# Patient Record
Sex: FEMALE | Race: WHITE
[De-identification: names, ages, dates, MRNs, and addresses within clinical notes are randomized per-mention and may not be internally consistent; named-entity substitution may affect disease eponyms.]

---

## 2019-07-30 ENCOUNTER — HOSPITAL ENCOUNTER (INPATIENT)
Dept: HOSPITAL 95 - ER | Age: 59
LOS: 2 days | Discharge: HOME | DRG: 897 | End: 2019-08-01
Attending: FAMILY MEDICINE | Admitting: FAMILY MEDICINE
Payer: COMMERCIAL

## 2019-07-30 VITALS — WEIGHT: 110.01 LBS | HEIGHT: 62 IN | BODY MASS INDEX: 20.24 KG/M2

## 2019-07-30 DIAGNOSIS — R74.0: ICD-10-CM

## 2019-07-30 DIAGNOSIS — F17.200: ICD-10-CM

## 2019-07-30 DIAGNOSIS — E44.0: ICD-10-CM

## 2019-07-30 DIAGNOSIS — E86.9: ICD-10-CM

## 2019-07-30 DIAGNOSIS — I10: ICD-10-CM

## 2019-07-30 DIAGNOSIS — E83.42: ICD-10-CM

## 2019-07-30 DIAGNOSIS — M32.9: ICD-10-CM

## 2019-07-30 DIAGNOSIS — J44.9: ICD-10-CM

## 2019-07-30 DIAGNOSIS — E78.5: ICD-10-CM

## 2019-07-30 DIAGNOSIS — F10.239: Primary | ICD-10-CM

## 2019-07-30 DIAGNOSIS — E87.6: ICD-10-CM

## 2019-07-30 LAB
ALBUMIN SERPL BCP-MCNC: 3.9 G/DL (ref 3.4–5)
ALBUMIN/GLOB SERPL: 1 {RATIO} (ref 0.8–1.8)
ALT SERPL W P-5'-P-CCNC: 57 U/L (ref 12–78)
AMYLASE SERPL-CCNC: 56 U/L (ref 25–115)
ANION GAP SERPL CALCULATED.4IONS-SCNC: 18 MMOL/L (ref 6–16)
AST SERPL W P-5'-P-CCNC: 116 U/L (ref 12–37)
BASOPHILS # BLD AUTO: 0.05 K/MM3 (ref 0–0.23)
BASOPHILS NFR BLD AUTO: 1 % (ref 0–2)
BENZODIAZ UR-MCNC: DETECTED UG/L
BILIRUB SERPL-MCNC: 0.7 MG/DL (ref 0.1–1)
BUN SERPL-MCNC: 8 MG/DL (ref 8–24)
CALCIUM SERPL-MCNC: 8.6 MG/DL (ref 8.5–10.1)
CHLORIDE SERPL-SCNC: 99 MMOL/L (ref 98–108)
CO2 SERPL-SCNC: 24 MMOL/L (ref 21–32)
CREAT SERPL-MCNC: 0.73 MG/DL (ref 0.4–1)
DEPRECATED RDW RBC AUTO: 59.6 FL (ref 35.1–46.3)
EOSINOPHIL # BLD AUTO: 0.03 K/MM3 (ref 0–0.68)
EOSINOPHIL NFR BLD AUTO: 0 % (ref 0–6)
ERYTHROCYTE [DISTWIDTH] IN BLOOD BY AUTOMATED COUNT: 14.2 % (ref 11.7–14.2)
ETHANOL SERPL-MCNC: <3 MG/DL
GLOBULIN SER CALC-MCNC: 4 G/DL (ref 2.2–4)
GLUCOSE SERPL-MCNC: 140 MG/DL (ref 70–99)
HCT VFR BLD AUTO: 40.4 % (ref 33–51)
HGB BLD-MCNC: 13.6 G/DL (ref 11.5–16)
IMM GRANULOCYTES # BLD AUTO: 0.04 K/MM3 (ref 0–0.1)
IMM GRANULOCYTES NFR BLD AUTO: 0 % (ref 0–1)
LEUKOCYTE ESTERASE UR QL STRIP: (no result)
LYMPHOCYTES # BLD AUTO: 1.42 K/MM3 (ref 0.84–5.2)
LYMPHOCYTES NFR BLD AUTO: 15 % (ref 21–46)
MAGNESIUM SERPL-MCNC: 0.7 MG/DL (ref 1.6–2.4)
MCHC RBC AUTO-ENTMCNC: 33.7 G/DL (ref 31.5–36.5)
MCV RBC AUTO: 115 FL (ref 80–100)
MONOCYTES # BLD AUTO: 1.11 K/MM3 (ref 0.16–1.47)
MONOCYTES NFR BLD AUTO: 12 % (ref 4–13)
NEUTROPHILS # BLD AUTO: 6.94 K/MM3 (ref 1.96–9.15)
NEUTROPHILS NFR BLD AUTO: 72 % (ref 41–73)
NRBC # BLD AUTO: 0.13 K/MM3 (ref 0–0.02)
NRBC BLD AUTO-RTO: 1.4 /100 WBC (ref 0–0.2)
PHOSPHATE SERPL-MCNC: 2.5 MG/DL (ref 2.5–4.9)
PLATELET # BLD AUTO: 192 K/MM3 (ref 150–400)
POTASSIUM SERPL-SCNC: 3 MMOL/L (ref 3.5–5.5)
PROT SERPL-MCNC: 7.9 G/DL (ref 6.4–8.2)
PROTHROMBIN TIME: 11.1 SEC (ref 9.7–11.5)
RBC #/AREA URNS HPF: (no result) /HPF (ref 0–2)
SODIUM SERPL-SCNC: 141 MMOL/L (ref 136–145)
SP GR SPEC: 1 (ref 1–1.02)
TSH SERPL DL<=0.005 MIU/L-ACNC: 1.1 UIU/ML (ref 0.36–4.8)
UROBILINOGEN UR STRIP-MCNC: (no result) MG/DL
WBC #/AREA URNS HPF: (no result) /HPF (ref 0–5)

## 2019-07-30 PROCEDURE — G0480 DRUG TEST DEF 1-7 CLASSES: HCPCS

## 2019-07-30 PROCEDURE — C9113 INJ PANTOPRAZOLE SODIUM, VIA: HCPCS

## 2019-07-30 NOTE — NUR
SHIFT SUMMARY.
 
NO ACUTE CHANGES NOTED SINCE ADMIT. PT HAS BEEN MEDICATED PER EMAR FOR CWIA OF
9. PT STATES SHE IS VERY ANXIOUS ABOUT BEING ABLE TO SLEEP TONIGHT. PT'S VS
STABLE, PT IS A&Ox4 AND HAS BEEN USING THE CALL LIGHT APPROPRIATELY.
 
CALL LIGHT IN REACH, BED IS LOCKED AND LOW WILL CONTINUE TO MONITOR UNTIL
REPORT IS GIVEN TO ONCOMING RN.

## 2019-07-30 NOTE — NUR
PT ARRIVAL...
 
PT ARRIVED ON UNIT AT 1315, PT WAS ADMITTED FOR ETOH W/D. PT IS A&Ox4 AND SBA.
PT WAS ABLE TO SELF TRANSFER FROM THE [place] TO THE BED.  PT HAS SEVERE
TREMORS D/T ETOH W/D. PT'S LAST DRINK WAS "LAST NIGHT." PT STATED SHE DRINKS
"AT LEAST 20 SHOTS OF HARD ALCOHOL ALL DAY." PT'S VS STABLE AT THIS TIME. PT
ORIENTED TO ROOM.
 
WILL CONTINUE TO MONITOR.

## 2019-07-31 LAB
ALBUMIN SERPL BCP-MCNC: 2.9 G/DL (ref 3.4–5)
ALBUMIN/GLOB SERPL: 1 {RATIO} (ref 0.8–1.8)
ALT SERPL W P-5'-P-CCNC: 37 U/L (ref 12–78)
ANION GAP SERPL CALCULATED.4IONS-SCNC: 6 MMOL/L (ref 6–16)
AST SERPL W P-5'-P-CCNC: 64 U/L (ref 12–37)
BASOPHILS # BLD AUTO: 0.02 K/MM3 (ref 0–0.23)
BASOPHILS NFR BLD AUTO: 1 % (ref 0–2)
BILIRUB DIRECT SERPL-MCNC: 0.3 MG/DL (ref 0–0.3)
BILIRUB INDIRECT SERPL-MCNC: 0.5 MG/DL (ref 0.1–0.7)
BILIRUB SERPL-MCNC: 0.8 MG/DL (ref 0.1–1)
BUN SERPL-MCNC: 4 MG/DL (ref 8–24)
CALCIUM SERPL-MCNC: 7.5 MG/DL (ref 8.5–10.1)
CHLORIDE SERPL-SCNC: 104 MMOL/L (ref 98–108)
CO2 SERPL-SCNC: 31 MMOL/L (ref 21–32)
CREAT SERPL-MCNC: 0.74 MG/DL (ref 0.4–1)
DEPRECATED RDW RBC AUTO: 62.4 FL (ref 35.1–46.3)
EOSINOPHIL # BLD AUTO: 0.08 K/MM3 (ref 0–0.68)
EOSINOPHIL NFR BLD AUTO: 2 % (ref 0–6)
ERYTHROCYTE [DISTWIDTH] IN BLOOD BY AUTOMATED COUNT: 14.5 % (ref 11.7–14.2)
GLOBULIN SER CALC-MCNC: 3 G/DL (ref 2.2–4)
GLUCOSE SERPL-MCNC: 96 MG/DL (ref 70–99)
HCT VFR BLD AUTO: 33.8 % (ref 33–51)
HGB BLD-MCNC: 11 G/DL (ref 11.5–16)
IMM GRANULOCYTES # BLD AUTO: 0.02 K/MM3 (ref 0–0.1)
IMM GRANULOCYTES NFR BLD AUTO: 1 % (ref 0–1)
LYMPHOCYTES # BLD AUTO: 1.17 K/MM3 (ref 0.84–5.2)
LYMPHOCYTES NFR BLD AUTO: 27 % (ref 21–46)
MAGNESIUM SERPL-MCNC: 1.6 MG/DL (ref 1.6–2.4)
MCHC RBC AUTO-ENTMCNC: 32.5 G/DL (ref 31.5–36.5)
MCV RBC AUTO: 118 FL (ref 80–100)
MONOCYTES # BLD AUTO: 0.66 K/MM3 (ref 0.16–1.47)
MONOCYTES NFR BLD AUTO: 15 % (ref 4–13)
NEUTROPHILS # BLD AUTO: 2.46 K/MM3 (ref 1.96–9.15)
NEUTROPHILS NFR BLD AUTO: 56 % (ref 41–73)
NRBC # BLD AUTO: 0.07 K/MM3 (ref 0–0.02)
NRBC BLD AUTO-RTO: 1.6 /100 WBC (ref 0–0.2)
PLATELET # BLD AUTO: 147 K/MM3 (ref 150–400)
POTASSIUM SERPL-SCNC: 3.4 MMOL/L (ref 3.5–5.5)
PROT SERPL-MCNC: 5.9 G/DL (ref 6.4–8.2)
SODIUM SERPL-SCNC: 141 MMOL/L (ref 136–145)

## 2019-07-31 NOTE — NUR
ASSUMED CARE
PT CARE ASSUMED AT APPROXIMATELY 0000. PT IS CURRENLTY RESTING IN BED AND
APPEARS TO BE SLEEPING. RESPIRATIONS ARE EVEN AND UNLABORED. MOST RECENT VSS.
BED IS IN LOW POSTION, CALL LIGHT IN REACH AND BED ALARM CURRENTLY SET FOR
SAFETY.

## 2019-07-31 NOTE — NUR
SHIFT SUMMARY.
 
NO ACUTE CHANGES NOTED THIS SHIFT. PT'S LAST CIWA WAS 11, PT WAS MEDICATED PER
EMAR/CIWA PROTOCOL WITH GOOD RESULTS. PT'S VS STABLE T/O SHIFT. PT DENIES ANY
CHEST PAIN/PRESSURE, N/V OR SOB. PT HAS HAD FAMILY AND FRIENDS AT THE BEDSIDE
WHO ALL ARE SUPPORTIVE IN THE PT GETTING SOBER.  PT HAS HAD MOMENTS OF SLIGHT
CONFUSION. BED ALARM IS ON AT THIS TIME.
 
CALL LIGHT IN REACH, BED IS LOCKED AND LOW WILL CONTINUE TO MONITOR.

## 2019-07-31 NOTE — NUR
AM NOTE.
 
ASSUMED CARE OF PT APROX 0700. PT IS A&Ox4 AND SBA IN THE ROOM. PT IS ADMITTED
FOR ETOH W/D. PT HAS SEVERE TREMORS FROM THE W/D AT THIS TIME. PT STATES SHE
FEELS WEAKER THAN HER NORMAL. PT DENIES ANY CHEST PAIN/PRESSURE, N/V OR SOB AT
THIS TIME. PT STATES SHE IS DETERMINED TO GET SOBER AND STAY SOBER, PT ALSO
STATES HER  ABUSES ETOH AS WELL. PT'S VS STABLE AT THIS TIME EXCEPT HER
HEART RATE, PER MONITOR TECH SHE HAS BEEN RANGING FROM SINUS TACH 120'S-150'S.
 
WILL CONTINUE TO MONITOR

## 2019-07-31 NOTE — NUR
SHIFT SUMMARY
PT HAS REMAINED AOX4 THROUGHOUT REMAINDER OF SHIFT. VSS. PLEASANT AND
COOPERATIVE WITH CARE. MOST RECENT CIWA OF 6 @ 0400 AND PATIENT MEDICATED FOR
WITHDRAWAL SYMPTOMS. PT HAS RESTED WELL THROUGHOUT THE NIGHT. AMBUILATES WITH
ONE PERSON ASSIST AND FWW. PT REPORTS THAT SHE DOES NOT USE WALKER AT HOME,
BUT CURRENTLY UTILIZING ONE R/T TREMORS. NO OTHER CHANGES NOTED FROM SHIFT
ASSESSMENT. WILL CONTINUE TO MONITOR AND REPORT TO ONCOMING SHIFT RN. BED IN
LOW POSITION, CALL LIGHT IN REACH. BED ALARM SET FOR SAFETY.

## 2019-07-31 NOTE — NUR
Assumed care of pt at approx 1900. Pt presents resting in bed with eyes
closed. When pt care initiated, pt woke to light verbal stimuli. Pt with
tremors, breathing easy and unlabored, denies chest pain, denies chest
pressure. VSS but with sinus tach. Pt sleeping throughout this shift. 1 mg
ativan given this shift. See shift assessment for detailed systems assessment.
Report given to KONSTANTIN Anderson to assume care.

## 2019-08-01 LAB
ALBUMIN SERPL BCP-MCNC: 2.7 G/DL (ref 3.4–5)
ALBUMIN/GLOB SERPL: 0.9 {RATIO} (ref 0.8–1.8)
ALT SERPL W P-5'-P-CCNC: 33 U/L (ref 12–78)
ANION GAP SERPL CALCULATED.4IONS-SCNC: 6 MMOL/L (ref 6–16)
AST SERPL W P-5'-P-CCNC: 48 U/L (ref 12–37)
BASOPHILS # BLD AUTO: 0.02 K/MM3 (ref 0–0.23)
BASOPHILS NFR BLD AUTO: 0 % (ref 0–2)
BILIRUB SERPL-MCNC: 0.5 MG/DL (ref 0.1–1)
BUN SERPL-MCNC: 4 MG/DL (ref 8–24)
CALCIUM SERPL-MCNC: 7.6 MG/DL (ref 8.5–10.1)
CHLORIDE SERPL-SCNC: 107 MMOL/L (ref 98–108)
CO2 SERPL-SCNC: 29 MMOL/L (ref 21–32)
CREAT SERPL-MCNC: 0.71 MG/DL (ref 0.4–1)
DEPRECATED RDW RBC AUTO: 58.6 FL (ref 35.1–46.3)
EOSINOPHIL # BLD AUTO: 0.1 K/MM3 (ref 0–0.68)
EOSINOPHIL NFR BLD AUTO: 2 % (ref 0–6)
ERYTHROCYTE [DISTWIDTH] IN BLOOD BY AUTOMATED COUNT: 13.5 % (ref 11.7–14.2)
GLOBULIN SER CALC-MCNC: 2.9 G/DL (ref 2.2–4)
GLUCOSE SERPL-MCNC: 88 MG/DL (ref 70–99)
HCT VFR BLD AUTO: 33.8 % (ref 33–51)
HGB BLD-MCNC: 11.1 G/DL (ref 11.5–16)
IMM GRANULOCYTES # BLD AUTO: 0.04 K/MM3 (ref 0–0.1)
IMM GRANULOCYTES NFR BLD AUTO: 1 % (ref 0–1)
LYMPHOCYTES # BLD AUTO: 1.58 K/MM3 (ref 0.84–5.2)
LYMPHOCYTES NFR BLD AUTO: 33 % (ref 21–46)
MCHC RBC AUTO-ENTMCNC: 32.8 G/DL (ref 31.5–36.5)
MCV RBC AUTO: 118 FL (ref 80–100)
MONOCYTES # BLD AUTO: 0.73 K/MM3 (ref 0.16–1.47)
MONOCYTES NFR BLD AUTO: 15 % (ref 4–13)
NEUTROPHILS # BLD AUTO: 2.37 K/MM3 (ref 1.96–9.15)
NEUTROPHILS NFR BLD AUTO: 49 % (ref 41–73)
NRBC # BLD AUTO: 0.05 K/MM3 (ref 0–0.02)
NRBC BLD AUTO-RTO: 1 /100 WBC (ref 0–0.2)
PLATELET # BLD AUTO: 132 K/MM3 (ref 150–400)
POTASSIUM SERPL-SCNC: 3.2 MMOL/L (ref 3.5–5.5)
PROT SERPL-MCNC: 5.6 G/DL (ref 6.4–8.2)
SODIUM SERPL-SCNC: 142 MMOL/L (ref 136–145)

## 2019-08-01 NOTE — NUR
Shift Summary
No acute changes this shift. VSS, CIWA stable at 7, 0, 0. total of 1mg
ativan given thus far this shift per orders. Pt up to bathroom several
times for voids, stability and strength improving. Pt ambulates with FWW.
Pt calls appropriately. Breathing easy and unlabored, RA, no apparent
sign of distress. No acute changes from initial shift assessment. Will
continue to monitor and update as needed until report given to oncoming
RN.

## 2019-08-01 NOTE — NUR
AM NOTE.
 
ASSUMED CARE OF PT APROX 0700 PT IS A&Ox4 AND SBA IN THE ROOM. PT'S VS STABLE
AT THIS TIME. NO CONFUSION NOTED. PT STATES SHE FEELS THAT SHE IS IMPROVING.
L/S CLEAR T/O PT IS ON RA, PT DENIES CHEST PAIN, N/V OR SOB. NO EVENTS NOTED
ON TELE.
 
CALL LIGHT IN REACH

## 2020-04-20 ENCOUNTER — HOSPITAL ENCOUNTER (OUTPATIENT)
Dept: HOSPITAL 95 - LAB SHORT | Age: 60
Discharge: HOME | End: 2020-04-20
Attending: FAMILY MEDICINE
Payer: COMMERCIAL

## 2020-04-20 DIAGNOSIS — F10.20: Primary | ICD-10-CM

## 2020-04-20 LAB
ALBUMIN SERPL BCP-MCNC: 3.5 G/DL (ref 3.4–5)
ALBUMIN/GLOB SERPL: 0.9 {RATIO} (ref 0.8–1.8)
ALT SERPL W P-5'-P-CCNC: 69 U/L (ref 12–78)
ANION GAP SERPL CALCULATED.4IONS-SCNC: 12 MMOL/L (ref 6–16)
AST SERPL W P-5'-P-CCNC: 329 U/L (ref 12–37)
BASOPHILS # BLD AUTO: 0.05 K/MM3 (ref 0–0.23)
BASOPHILS NFR BLD AUTO: 1 % (ref 0–2)
BILIRUB SERPL-MCNC: 0.8 MG/DL (ref 0.1–1)
BUN SERPL-MCNC: 8 MG/DL (ref 8–24)
CALCIUM SERPL-MCNC: 8.1 MG/DL (ref 8.5–10.1)
CHLORIDE SERPL-SCNC: 99 MMOL/L (ref 98–108)
CO2 SERPL-SCNC: 30 MMOL/L (ref 21–32)
CREAT SERPL-MCNC: 0.95 MG/DL (ref 0.4–1)
DEPRECATED RDW RBC AUTO: 65.1 FL (ref 35.1–46.3)
EOSINOPHIL # BLD AUTO: 0.07 K/MM3 (ref 0–0.68)
EOSINOPHIL NFR BLD AUTO: 1 % (ref 0–6)
ERYTHROCYTE [DISTWIDTH] IN BLOOD BY AUTOMATED COUNT: 18.1 % (ref 11.7–14.2)
GLOBULIN SER CALC-MCNC: 4 G/DL (ref 2.2–4)
GLUCOSE SERPL-MCNC: 95 MG/DL (ref 70–99)
HCT VFR BLD AUTO: 34.8 % (ref 33–51)
HGB BLD-MCNC: 11.8 G/DL (ref 11.5–16)
IMM GRANULOCYTES # BLD AUTO: 0.02 K/MM3 (ref 0–0.1)
IMM GRANULOCYTES NFR BLD AUTO: 0 % (ref 0–1)
LYMPHOCYTES # BLD AUTO: 1.49 K/MM3 (ref 0.84–5.2)
LYMPHOCYTES NFR BLD AUTO: 30 % (ref 21–46)
MCHC RBC AUTO-ENTMCNC: 33.9 G/DL (ref 31.5–36.5)
MCV RBC AUTO: 99 FL (ref 80–100)
MONOCYTES # BLD AUTO: 0.76 K/MM3 (ref 0.16–1.47)
MONOCYTES NFR BLD AUTO: 15 % (ref 4–13)
NEUTROPHILS # BLD AUTO: 2.66 K/MM3 (ref 1.96–9.15)
NEUTROPHILS NFR BLD AUTO: 53 % (ref 41–73)
NRBC # BLD AUTO: 0.03 K/MM3 (ref 0–0.02)
NRBC BLD AUTO-RTO: 0.6 /100 WBC (ref 0–0.2)
PLATELET # BLD AUTO: 223 K/MM3 (ref 150–400)
POTASSIUM SERPL-SCNC: 3.5 MMOL/L (ref 3.5–5.5)
PROT SERPL-MCNC: 7.5 G/DL (ref 6.4–8.2)
SODIUM SERPL-SCNC: 141 MMOL/L (ref 136–145)
TSH SERPL DL<=0.005 MIU/L-ACNC: 0.4 UIU/ML (ref 0.36–4.8)

## 2020-05-04 ENCOUNTER — HOSPITAL ENCOUNTER (INPATIENT)
Age: 60
LOS: 11 days | Discharge: NURSING, REHAB OR LONG-TERM CARE | DRG: 897 | End: 2020-05-15
Attending: FAMILY MEDICINE | Admitting: FAMILY MEDICINE
Payer: COMMERCIAL

## 2020-05-04 DIAGNOSIS — R74.0: ICD-10-CM

## 2020-05-04 DIAGNOSIS — F32.9: ICD-10-CM

## 2020-05-04 DIAGNOSIS — E83.39: ICD-10-CM

## 2020-05-04 DIAGNOSIS — E78.5: ICD-10-CM

## 2020-05-04 DIAGNOSIS — I10: ICD-10-CM

## 2020-05-04 DIAGNOSIS — K80.20: ICD-10-CM

## 2020-05-04 DIAGNOSIS — D69.6: ICD-10-CM

## 2020-05-04 DIAGNOSIS — G93.40: ICD-10-CM

## 2020-05-04 DIAGNOSIS — E44.0: ICD-10-CM

## 2020-05-04 DIAGNOSIS — H40.9: ICD-10-CM

## 2020-05-04 DIAGNOSIS — F17.210: ICD-10-CM

## 2020-05-04 DIAGNOSIS — E83.42: ICD-10-CM

## 2020-05-04 DIAGNOSIS — F10.231: Primary | ICD-10-CM

## 2020-05-04 DIAGNOSIS — K70.9: ICD-10-CM

## 2020-05-04 DIAGNOSIS — M32.9: ICD-10-CM

## 2020-05-04 DIAGNOSIS — E87.6: ICD-10-CM

## 2020-05-04 LAB
ALBUMIN SERPL BCP-MCNC: 3.4 G/DL (ref 3.4–5)
ALBUMIN/GLOB SERPL: 0.8 {RATIO} (ref 0.8–1.8)
ALT SERPL W P-5'-P-CCNC: 37 U/L (ref 12–78)
ANION GAP SERPL CALCULATED.4IONS-SCNC: 19 MMOL/L (ref 6–16)
ANISOCYTOSIS BLD QL SMEAR: (no result) /HPF
AST SERPL W P-5'-P-CCNC: 262 U/L (ref 12–37)
BASOPHILS # BLD AUTO: 0.03 K/MM3 (ref 0–0.23)
BASOPHILS NFR BLD AUTO: 0 % (ref 0–2)
BILIRUB SERPL-MCNC: 3.6 MG/DL (ref 0.1–1)
BUN SERPL-MCNC: 6 MG/DL (ref 8–24)
CALCIUM SERPL-MCNC: 7.6 MG/DL (ref 8.5–10.1)
CHLORIDE SERPL-SCNC: 91 MMOL/L (ref 98–108)
CO2 SERPL-SCNC: 23 MMOL/L (ref 21–32)
CREAT SERPL-MCNC: 0.72 MG/DL (ref 0.4–1)
DEPRECATED RDW RBC AUTO: 69.8 FL (ref 35.1–46.3)
EOSINOPHIL # BLD AUTO: 0.02 K/MM3 (ref 0–0.68)
EOSINOPHIL NFR BLD AUTO: 0 % (ref 0–6)
ERYTHROCYTE [DISTWIDTH] IN BLOOD BY AUTOMATED COUNT: 19.9 % (ref 11.7–14.2)
ETHANOL SERPL-MCNC: 3 MG/DL
GLOBULIN SER CALC-MCNC: 4.1 G/DL (ref 2.2–4)
GLUCOSE SERPL-MCNC: 113 MG/DL (ref 70–99)
HCT VFR BLD AUTO: 33.4 % (ref 33–51)
HGB BLD-MCNC: 11.1 G/DL (ref 11.5–16)
IMM GRANULOCYTES # BLD AUTO: 0.08 K/MM3 (ref 0–0.1)
IMM GRANULOCYTES NFR BLD AUTO: 1 % (ref 0–1)
LYMPHOCYTES # BLD AUTO: 0.62 K/MM3 (ref 0.84–5.2)
LYMPHOCYTES NFR BLD AUTO: 8 % (ref 21–46)
MCHC RBC AUTO-ENTMCNC: 33.2 G/DL (ref 31.5–36.5)
MCV RBC AUTO: 100 FL (ref 80–100)
MONOCYTES # BLD AUTO: 0.77 K/MM3 (ref 0.16–1.47)
MONOCYTES NFR BLD AUTO: 10 % (ref 4–13)
NEUTROPHILS # BLD AUTO: 6.09 K/MM3 (ref 1.96–9.15)
NEUTROPHILS NFR BLD AUTO: 80 % (ref 41–73)
NRBC # BLD AUTO: 0.45 K/MM3 (ref 0–0.02)
NRBC BLD AUTO-RTO: 5.9 /100 WBC (ref 0–0.2)
PLATELET # BLD AUTO: 129 K/MM3 (ref 150–400)
POTASSIUM SERPL-SCNC: 2.7 MMOL/L (ref 3.5–5.5)
PROT SERPL-MCNC: 7.5 G/DL (ref 6.4–8.2)
SODIUM SERPL-SCNC: 133 MMOL/L (ref 136–145)

## 2020-05-05 LAB
ALBUMIN SERPL BCP-MCNC: 2.7 G/DL (ref 3.4–5)
ALBUMIN/GLOB SERPL: 0.8 {RATIO} (ref 0.8–1.8)
ALT SERPL W P-5'-P-CCNC: 33 U/L (ref 12–78)
ANION GAP SERPL CALCULATED.4IONS-SCNC: 6 MMOL/L (ref 6–16)
ANION GAP SERPL CALCULATED.4IONS-SCNC: 9 MMOL/L (ref 6–16)
ANISOCYTOSIS BLD QL SMEAR: (no result) /HPF
AST SERPL W P-5'-P-CCNC: 289 U/L (ref 12–37)
BASOPHILS # BLD AUTO: 0.02 K/MM3 (ref 0–0.23)
BASOPHILS NFR BLD AUTO: 0 % (ref 0–2)
BILIRUB SERPL-MCNC: 4.4 MG/DL (ref 0.1–1)
BUN SERPL-MCNC: 4 MG/DL (ref 8–24)
BUN SERPL-MCNC: 4 MG/DL (ref 8–24)
CALCIUM SERPL-MCNC: 6.9 MG/DL (ref 8.5–10.1)
CALCIUM SERPL-MCNC: 8.1 MG/DL (ref 8.5–10.1)
CHLORIDE SERPL-SCNC: 98 MMOL/L (ref 98–108)
CHLORIDE SERPL-SCNC: 98 MMOL/L (ref 98–108)
CO2 SERPL-SCNC: 28 MMOL/L (ref 21–32)
CO2 SERPL-SCNC: 30 MMOL/L (ref 21–32)
CREAT SERPL-MCNC: 0.72 MG/DL (ref 0.4–1)
CREAT SERPL-MCNC: 0.84 MG/DL (ref 0.4–1)
DEPRECATED RDW RBC AUTO: 70.6 FL (ref 35.1–46.3)
EOSINOPHIL # BLD AUTO: 0.03 K/MM3 (ref 0–0.68)
EOSINOPHIL NFR BLD AUTO: 1 % (ref 0–6)
ERYTHROCYTE [DISTWIDTH] IN BLOOD BY AUTOMATED COUNT: 20 % (ref 11.7–14.2)
GLOBULIN SER CALC-MCNC: 3.4 G/DL (ref 2.2–4)
GLUCOSE SERPL-MCNC: 74 MG/DL (ref 70–99)
GLUCOSE SERPL-MCNC: 95 MG/DL (ref 70–99)
HCT VFR BLD AUTO: 29 % (ref 33–51)
HGB BLD-MCNC: 9.7 G/DL (ref 11.5–16)
IMM GRANULOCYTES # BLD AUTO: 0.05 K/MM3 (ref 0–0.1)
IMM GRANULOCYTES NFR BLD AUTO: 1 % (ref 0–1)
LYMPHOCYTES # BLD AUTO: 0.98 K/MM3 (ref 0.84–5.2)
LYMPHOCYTES NFR BLD AUTO: 21 % (ref 21–46)
MAGNESIUM SERPL-MCNC: 1.8 MG/DL (ref 1.6–2.4)
MCHC RBC AUTO-ENTMCNC: 33.4 G/DL (ref 31.5–36.5)
MCV RBC AUTO: 100 FL (ref 80–100)
MONOCYTES # BLD AUTO: 0.49 K/MM3 (ref 0.16–1.47)
MONOCYTES NFR BLD AUTO: 11 % (ref 4–13)
NEUTROPHILS # BLD AUTO: 3 K/MM3 (ref 1.96–9.15)
NEUTROPHILS NFR BLD AUTO: 66 % (ref 41–73)
NRBC # BLD AUTO: 0.27 K/MM3 (ref 0–0.02)
NRBC BLD AUTO-RTO: 5.9 /100 WBC (ref 0–0.2)
PLATELET # BLD AUTO: 94 K/MM3 (ref 150–400)
PLATELET # BLD EST: (no result) K/MM3
POTASSIUM SERPL-SCNC: 2.5 MMOL/L (ref 3.5–5.5)
POTASSIUM SERPL-SCNC: 3.9 MMOL/L (ref 3.5–5.5)
PROT SERPL-MCNC: 6.1 G/DL (ref 6.4–8.2)
SODIUM SERPL-SCNC: 132 MMOL/L (ref 136–145)
SODIUM SERPL-SCNC: 137 MMOL/L (ref 136–145)

## 2020-05-06 LAB
ALBUMIN SERPL BCP-MCNC: 2.4 G/DL (ref 3.4–5)
ALBUMIN/GLOB SERPL: 0.9 {RATIO} (ref 0.8–1.8)
ALT SERPL W P-5'-P-CCNC: 29 U/L (ref 12–78)
ANION GAP SERPL CALCULATED.4IONS-SCNC: 7 MMOL/L (ref 6–16)
ANISOCYTOSIS BLD QL SMEAR: (no result) /HPF
AST SERPL W P-5'-P-CCNC: 190 U/L (ref 12–37)
BASOPHILS # BLD AUTO: 0.01 K/MM3 (ref 0–0.23)
BASOPHILS NFR BLD AUTO: 0 % (ref 0–2)
BILIRUB SERPL-MCNC: 4 MG/DL (ref 0.1–1)
BUN SERPL-MCNC: 6 MG/DL (ref 8–24)
CALCIUM SERPL-MCNC: 7.5 MG/DL (ref 8.5–10.1)
CHLORIDE SERPL-SCNC: 107 MMOL/L (ref 98–108)
CO2 SERPL-SCNC: 27 MMOL/L (ref 21–32)
CREAT SERPL-MCNC: 0.88 MG/DL (ref 0.4–1)
DEPRECATED RDW RBC AUTO: 76 FL (ref 35.1–46.3)
EOSINOPHIL # BLD AUTO: 0.03 K/MM3 (ref 0–0.68)
EOSINOPHIL NFR BLD AUTO: 1 % (ref 0–6)
ERYTHROCYTE [DISTWIDTH] IN BLOOD BY AUTOMATED COUNT: 20.7 % (ref 11.7–14.2)
GLOBULIN SER CALC-MCNC: 2.7 G/DL (ref 2.2–4)
GLUCOSE SERPL-MCNC: 96 MG/DL (ref 70–99)
HCT VFR BLD AUTO: 26.9 % (ref 33–51)
HGB BLD-MCNC: 8.9 G/DL (ref 11.5–16)
IMM GRANULOCYTES # BLD AUTO: 0.04 K/MM3 (ref 0–0.1)
IMM GRANULOCYTES NFR BLD AUTO: 1 % (ref 0–1)
LYMPHOCYTES # BLD AUTO: 0.94 K/MM3 (ref 0.84–5.2)
LYMPHOCYTES NFR BLD AUTO: 19 % (ref 21–46)
MAGNESIUM SERPL-MCNC: 1.6 MG/DL (ref 1.6–2.4)
MCHC RBC AUTO-ENTMCNC: 33.1 G/DL (ref 31.5–36.5)
MCV RBC AUTO: 103 FL (ref 80–100)
MONOCYTES # BLD AUTO: 0.4 K/MM3 (ref 0.16–1.47)
MONOCYTES NFR BLD AUTO: 8 % (ref 4–13)
NEUTROPHILS # BLD AUTO: 3.51 K/MM3 (ref 1.96–9.15)
NEUTROPHILS NFR BLD AUTO: 71 % (ref 41–73)
NRBC # BLD AUTO: 0.09 K/MM3 (ref 0–0.02)
NRBC BLD AUTO-RTO: 1.8 /100 WBC (ref 0–0.2)
PLATELET # BLD AUTO: 93 K/MM3 (ref 150–400)
PLATELET # BLD EST: (no result) K/MM3
POTASSIUM SERPL-SCNC: 3.1 MMOL/L (ref 3.5–5.5)
PROT SERPL-MCNC: 5.1 G/DL (ref 6.4–8.2)
SODIUM SERPL-SCNC: 141 MMOL/L (ref 136–145)

## 2020-05-07 LAB
%HYPO/RBC NFR BLD AUTO: (no result) /HPF
ALBUMIN SERPL BCP-MCNC: 2.5 G/DL (ref 3.4–5)
ALBUMIN/GLOB SERPL: 0.8 {RATIO} (ref 0.8–1.8)
ALT SERPL W P-5'-P-CCNC: 35 U/L (ref 12–78)
ANION GAP SERPL CALCULATED.4IONS-SCNC: 8 MMOL/L (ref 6–16)
ANISOCYTOSIS BLD QL SMEAR: (no result) /HPF
AST SERPL W P-5'-P-CCNC: 173 U/L (ref 12–37)
BASOPHILS # BLD AUTO: 0.02 K/MM3 (ref 0–0.23)
BASOPHILS NFR BLD AUTO: 0 % (ref 0–2)
BILIRUB SERPL-MCNC: 4.3 MG/DL (ref 0.1–1)
BUN SERPL-MCNC: 4 MG/DL (ref 8–24)
CALCIUM SERPL-MCNC: 8 MG/DL (ref 8.5–10.1)
CHLORIDE SERPL-SCNC: 111 MMOL/L (ref 98–108)
CO2 SERPL-SCNC: 24 MMOL/L (ref 21–32)
CREAT SERPL-MCNC: 0.7 MG/DL (ref 0.4–1)
DEPRECATED RDW RBC AUTO: 84.7 FL (ref 35.1–46.3)
EOSINOPHIL # BLD AUTO: 0.07 K/MM3 (ref 0–0.68)
EOSINOPHIL NFR BLD AUTO: 1 % (ref 0–6)
ERYTHROCYTE [DISTWIDTH] IN BLOOD BY AUTOMATED COUNT: 22.3 % (ref 11.7–14.2)
GLOBULIN SER CALC-MCNC: 3.3 G/DL (ref 2.2–4)
GLUCOSE SERPL-MCNC: 81 MG/DL (ref 70–99)
HCT VFR BLD AUTO: 31.5 % (ref 33–51)
HGB BLD-MCNC: 10 G/DL (ref 11.5–16)
IMM GRANULOCYTES # BLD AUTO: 0.05 K/MM3 (ref 0–0.1)
IMM GRANULOCYTES NFR BLD AUTO: 1 % (ref 0–1)
LYMPHOCYTES # BLD AUTO: 0.82 K/MM3 (ref 0.84–5.2)
LYMPHOCYTES NFR BLD AUTO: 16 % (ref 21–46)
MACROCYTES BLD QL SMEAR: (no result) /HPF
MCHC RBC AUTO-ENTMCNC: 31.7 G/DL (ref 31.5–36.5)
MCV RBC AUTO: 106 FL (ref 80–100)
MONOCYTES # BLD AUTO: 0.37 K/MM3 (ref 0.16–1.47)
MONOCYTES NFR BLD AUTO: 7 % (ref 4–13)
NEUTROPHILS # BLD AUTO: 3.73 K/MM3 (ref 1.96–9.15)
NEUTROPHILS NFR BLD AUTO: 74 % (ref 41–73)
NRBC # BLD AUTO: 0.09 K/MM3 (ref 0–0.02)
NRBC BLD AUTO-RTO: 1.8 /100 WBC (ref 0–0.2)
PLATELET # BLD AUTO: 106 K/MM3 (ref 150–400)
POTASSIUM SERPL-SCNC: 3.5 MMOL/L (ref 3.5–5.5)
PROT SERPL-MCNC: 5.8 G/DL (ref 6.4–8.2)
SODIUM SERPL-SCNC: 143 MMOL/L (ref 136–145)

## 2020-05-08 LAB
%HYPO/RBC NFR BLD AUTO: (no result) /HPF
ALBUMIN SERPL BCP-MCNC: 2.1 G/DL (ref 3.4–5)
ALBUMIN/GLOB SERPL: 0.6 {RATIO} (ref 0.8–1.8)
ALT SERPL W P-5'-P-CCNC: 30 U/L (ref 12–78)
ANION GAP SERPL CALCULATED.4IONS-SCNC: 8 MMOL/L (ref 6–16)
ANISOCYTOSIS BLD QL SMEAR: (no result) /HPF
AST SERPL W P-5'-P-CCNC: 131 U/L (ref 12–37)
BASOPHILS # BLD: 0 K/MM3 (ref 0–0.23)
BASOPHILS NFR BLD: 0 % (ref 0–2)
BILIRUB SERPL-MCNC: 3.5 MG/DL (ref 0.1–1)
BUN SERPL-MCNC: 5 MG/DL (ref 8–24)
CALCIUM SERPL-MCNC: 7.4 MG/DL (ref 8.5–10.1)
CHLORIDE SERPL-SCNC: 114 MMOL/L (ref 98–108)
CO2 SERPL-SCNC: 21 MMOL/L (ref 21–32)
CREAT SERPL-MCNC: 0.62 MG/DL (ref 0.4–1)
DEPRECATED RDW RBC AUTO: 88.5 FL (ref 35.1–46.3)
EOSINOPHIL # BLD: 0 K/MM3 (ref 0–0.68)
EOSINOPHIL NFR BLD: 0 % (ref 0–6)
ERYTHROCYTE [DISTWIDTH] IN BLOOD BY AUTOMATED COUNT: 23.2 % (ref 11.7–14.2)
GLOBULIN SER CALC-MCNC: 3.4 G/DL (ref 2.2–4)
GLUCOSE SERPL-MCNC: 147 MG/DL (ref 70–99)
HCT VFR BLD AUTO: 29.9 % (ref 33–51)
HGB BLD-MCNC: 9.7 G/DL (ref 11.5–16)
LYMPHOCYTES # BLD: 0.25 K/MM3 (ref 0.84–5.2)
LYMPHOCYTES NFR BLD: 5 % (ref 21–46)
MACROCYTES BLD QL SMEAR: (no result) /HPF
MAGNESIUM SERPL-MCNC: 1.4 MG/DL (ref 1.6–2.4)
MCHC RBC AUTO-ENTMCNC: 32.4 G/DL (ref 31.5–36.5)
MCV RBC AUTO: 106 FL (ref 80–100)
MONOCYTES # BLD: 0.46 K/MM3 (ref 0.16–1.47)
MONOCYTES NFR BLD: 9 % (ref 4–13)
NEUTS BAND NFR BLD MANUAL: 8 % (ref 0–8)
NEUTS SEG # BLD MANUAL: 4.4 K/MM3 (ref 1.96–9.15)
NEUTS SEG NFR BLD MANUAL: 78 % (ref 41–73)
NRBC # BLD AUTO: 0.05 K/MM3 (ref 0–0.02)
NRBC BLD AUTO-RTO: 1 /100 WBC (ref 0–0.2)
PHOSPHATE SERPL-MCNC: 1 MG/DL (ref 2.5–4.9)
PLATELET # BLD AUTO: 100 K/MM3 (ref 150–400)
PLATELET # BLD EST: (no result) K/MM3
POTASSIUM SERPL-SCNC: 3.5 MMOL/L (ref 3.5–5.5)
PROT SERPL-MCNC: 5.5 G/DL (ref 6.4–8.2)
SODIUM SERPL-SCNC: 143 MMOL/L (ref 136–145)
TOTAL CELLS COUNTED BLD: 100
TOTAL CELLS PERCENT: 100 %

## 2020-05-09 LAB
%HYPO/RBC NFR BLD AUTO: (no result) /HPF
ALBUMIN SERPL BCP-MCNC: 2 G/DL (ref 3.4–5)
ALBUMIN/GLOB SERPL: 0.6 {RATIO} (ref 0.8–1.8)
ALT SERPL W P-5'-P-CCNC: 27 U/L (ref 12–78)
ANION GAP SERPL CALCULATED.4IONS-SCNC: 9 MMOL/L (ref 6–16)
ANISOCYTOSIS BLD QL SMEAR: (no result) /HPF
AST SERPL W P-5'-P-CCNC: 93 U/L (ref 12–37)
BASOPHILS # BLD: 0 K/MM3 (ref 0–0.23)
BASOPHILS NFR BLD: 0 % (ref 0–2)
BILIRUB SERPL-MCNC: 2.6 MG/DL (ref 0.1–1)
BUN SERPL-MCNC: 3 MG/DL (ref 8–24)
CALCIUM SERPL-MCNC: 6.8 MG/DL (ref 8.5–10.1)
CHLORIDE SERPL-SCNC: 116 MMOL/L (ref 98–108)
CO2 SERPL-SCNC: 20 MMOL/L (ref 21–32)
CREAT SERPL-MCNC: 0.53 MG/DL (ref 0.4–1)
DEPRECATED RDW RBC AUTO: 92.2 FL (ref 35.1–46.3)
EOSINOPHIL # BLD: 0.04 K/MM3 (ref 0–0.68)
EOSINOPHIL NFR BLD: 1 % (ref 0–6)
ERYTHROCYTE [DISTWIDTH] IN BLOOD BY AUTOMATED COUNT: 24.5 % (ref 11.7–14.2)
GLOBULIN SER CALC-MCNC: 3.3 G/DL (ref 2.2–4)
GLUCOSE SERPL-MCNC: 88 MG/DL (ref 70–99)
HCT VFR BLD AUTO: 30.6 % (ref 33–51)
HGB BLD-MCNC: 10 G/DL (ref 11.5–16)
LYMPHOCYTES # BLD: 0.64 K/MM3 (ref 0.84–5.2)
LYMPHOCYTES NFR BLD: 13 % (ref 21–46)
MAGNESIUM SERPL-MCNC: 1.1 MG/DL (ref 1.6–2.4)
MCHC RBC AUTO-ENTMCNC: 32.7 G/DL (ref 31.5–36.5)
MCV RBC AUTO: 106 FL (ref 80–100)
MONOCYTES # BLD: 0.55 K/MM3 (ref 0.16–1.47)
MONOCYTES NFR BLD: 12 % (ref 4–13)
MYELOCYTES # BLD MANUAL: 0.04 K/MM3 (ref 0–0)
MYELOCYTES NFR BLD MANUAL: 1 % (ref 0–0)
NEUTS BAND NFR BLD MANUAL: 9 % (ref 0–8)
NEUTS SEG # BLD MANUAL: 3.32 K/MM3 (ref 1.96–9.15)
NEUTS SEG NFR BLD MANUAL: 63 % (ref 41–73)
NRBC # BLD AUTO: 0.06 K/MM3 (ref 0–0.02)
NRBC BLD AUTO-RTO: 1.3 /100 WBC (ref 0–0.2)
PHOSPHATE SERPL-MCNC: 1.2 MG/DL (ref 2.5–4.9)
PLATELET # BLD AUTO: 97 K/MM3 (ref 150–400)
PLATELET # BLD EST: (no result) K/MM3
POTASSIUM SERPL-SCNC: 3.5 MMOL/L (ref 3.5–5.5)
PROT SERPL-MCNC: 5.3 G/DL (ref 6.4–8.2)
SODIUM SERPL-SCNC: 145 MMOL/L (ref 136–145)
SP GR SPEC: 1 (ref 1–1.02)
TARGETS BLD QL SMEAR: (no result) /HPF
TOTAL CELLS COUNTED BLD: 100
TOTAL CELLS PERCENT: 100 %
UROBILINOGEN UR STRIP-MCNC: (no result) MG/DL
VARIANT LYMPHS NFR BLD MANUAL: 1 % (ref 0–0)

## 2020-05-10 LAB
%HYPO/RBC NFR BLD AUTO: (no result) /HPF
ALBUMIN SERPL BCP-MCNC: 1.9 G/DL (ref 3.4–5)
ALBUMIN/GLOB SERPL: 0.6 {RATIO} (ref 0.8–1.8)
ALT SERPL W P-5'-P-CCNC: 28 U/L (ref 12–78)
ANION GAP SERPL CALCULATED.4IONS-SCNC: 10 MMOL/L (ref 6–16)
ANISOCYTOSIS BLD QL SMEAR: (no result) /HPF
AST SERPL W P-5'-P-CCNC: 82 U/L (ref 12–37)
BASOPHILS # BLD AUTO: 0.04 K/MM3 (ref 0–0.23)
BASOPHILS NFR BLD AUTO: 1 % (ref 0–2)
BILIRUB SERPL-MCNC: 2.1 MG/DL (ref 0.1–1)
BUN SERPL-MCNC: 2 MG/DL (ref 8–24)
CALCIUM SERPL-MCNC: 6.5 MG/DL (ref 8.5–10.1)
CHLORIDE SERPL-SCNC: 116 MMOL/L (ref 98–108)
CO2 SERPL-SCNC: 19 MMOL/L (ref 21–32)
CREAT SERPL-MCNC: 0.52 MG/DL (ref 0.4–1)
DEPRECATED RDW RBC AUTO: 90.8 FL (ref 35.1–46.3)
EOSINOPHIL # BLD AUTO: 0.04 K/MM3 (ref 0–0.68)
EOSINOPHIL NFR BLD AUTO: 1 % (ref 0–6)
ERYTHROCYTE [DISTWIDTH] IN BLOOD BY AUTOMATED COUNT: 24.8 % (ref 11.7–14.2)
GLOBULIN SER CALC-MCNC: 3.4 G/DL (ref 2.2–4)
GLUCOSE SERPL-MCNC: 91 MG/DL (ref 70–99)
HCT VFR BLD AUTO: 31 % (ref 33–51)
HGB BLD-MCNC: 10.3 G/DL (ref 11.5–16)
IMM GRANULOCYTES # BLD AUTO: 0.1 K/MM3 (ref 0–0.1)
IMM GRANULOCYTES NFR BLD AUTO: 2 % (ref 0–1)
LYMPHOCYTES # BLD AUTO: 0.98 K/MM3 (ref 0.84–5.2)
LYMPHOCYTES NFR BLD AUTO: 19 % (ref 21–46)
MACROCYTES BLD QL SMEAR: (no result) /HPF
MAGNESIUM SERPL-MCNC: 1.5 MG/DL (ref 1.6–2.4)
MCHC RBC AUTO-ENTMCNC: 33.2 G/DL (ref 31.5–36.5)
MCV RBC AUTO: 104 FL (ref 80–100)
MONOCYTES # BLD AUTO: 1.03 K/MM3 (ref 0.16–1.47)
MONOCYTES NFR BLD AUTO: 20 % (ref 4–13)
NEUTROPHILS # BLD AUTO: 3.05 K/MM3 (ref 1.96–9.15)
NEUTROPHILS NFR BLD AUTO: 58 % (ref 41–73)
NRBC # BLD AUTO: 0.05 K/MM3 (ref 0–0.02)
NRBC BLD AUTO-RTO: 1 /100 WBC (ref 0–0.2)
PHOSPHATE SERPL-MCNC: 3.2 MG/DL (ref 2.5–4.9)
PLATELET # BLD AUTO: 84 K/MM3 (ref 150–400)
PLATELET # BLD EST: (no result) K/MM3
POTASSIUM SERPL-SCNC: 4.1 MMOL/L (ref 3.5–5.5)
PROT SERPL-MCNC: 5.3 G/DL (ref 6.4–8.2)
SODIUM SERPL-SCNC: 145 MMOL/L (ref 136–145)
TARGETS BLD QL SMEAR: (no result) /HPF

## 2020-05-11 LAB
ALBUMIN SERPL BCP-MCNC: 1.9 G/DL (ref 3.4–5)
ALBUMIN/GLOB SERPL: 0.6 {RATIO} (ref 0.8–1.8)
ALT SERPL W P-5'-P-CCNC: 27 U/L (ref 12–78)
ANION GAP SERPL CALCULATED.4IONS-SCNC: 8 MMOL/L (ref 6–16)
ANISOCYTOSIS BLD QL SMEAR: (no result) /HPF
AST SERPL W P-5'-P-CCNC: 100 U/L (ref 12–37)
BASOPHILS # BLD AUTO: 0.03 K/MM3 (ref 0–0.23)
BASOPHILS NFR BLD AUTO: 1 % (ref 0–2)
BILIRUB SERPL-MCNC: 1.9 MG/DL (ref 0.1–1)
BUN SERPL-MCNC: 2 MG/DL (ref 8–24)
CALCIUM SERPL-MCNC: 6.4 MG/DL (ref 8.5–10.1)
CHLORIDE SERPL-SCNC: 118 MMOL/L (ref 98–108)
CO2 SERPL-SCNC: 19 MMOL/L (ref 21–32)
CREAT SERPL-MCNC: 0.6 MG/DL (ref 0.4–1)
DEPRECATED RDW RBC AUTO: 95.8 FL (ref 35.1–46.3)
EOSINOPHIL # BLD AUTO: 0.03 K/MM3 (ref 0–0.68)
EOSINOPHIL NFR BLD AUTO: 1 % (ref 0–6)
ERYTHROCYTE [DISTWIDTH] IN BLOOD BY AUTOMATED COUNT: 25.4 % (ref 11.7–14.2)
GLOBULIN SER CALC-MCNC: 3.1 G/DL (ref 2.2–4)
GLUCOSE SERPL-MCNC: 74 MG/DL (ref 70–99)
HCT VFR BLD AUTO: 28.9 % (ref 33–51)
HGB BLD-MCNC: 9.3 G/DL (ref 11.5–16)
IMM GRANULOCYTES # BLD AUTO: 0.17 K/MM3 (ref 0–0.1)
IMM GRANULOCYTES NFR BLD AUTO: 3 % (ref 0–1)
LYMPHOCYTES # BLD AUTO: 1.28 K/MM3 (ref 0.84–5.2)
LYMPHOCYTES NFR BLD AUTO: 20 % (ref 21–46)
MAGNESIUM SERPL-MCNC: 1.2 MG/DL (ref 1.6–2.4)
MCHC RBC AUTO-ENTMCNC: 32.2 G/DL (ref 31.5–36.5)
MCV RBC AUTO: 106 FL (ref 80–100)
MONOCYTES # BLD AUTO: 1.35 K/MM3 (ref 0.16–1.47)
MONOCYTES NFR BLD AUTO: 21 % (ref 4–13)
NEUTROPHILS # BLD AUTO: 3.65 K/MM3 (ref 1.96–9.15)
NEUTROPHILS NFR BLD AUTO: 56 % (ref 41–73)
NRBC # BLD AUTO: 0.04 K/MM3 (ref 0–0.02)
NRBC BLD AUTO-RTO: 0.6 /100 WBC (ref 0–0.2)
PHOSPHATE SERPL-MCNC: 2.9 MG/DL (ref 2.5–4.9)
PLATELET # BLD AUTO: 110 K/MM3 (ref 150–400)
POTASSIUM SERPL-SCNC: 4.3 MMOL/L (ref 3.5–5.5)
PROT SERPL-MCNC: 5 G/DL (ref 6.4–8.2)
SODIUM SERPL-SCNC: 145 MMOL/L (ref 136–145)

## 2020-05-12 LAB
%HYPO/RBC NFR BLD AUTO: (no result) /HPF
ALBUMIN SERPL BCP-MCNC: 2.2 G/DL (ref 3.4–5)
ALBUMIN/GLOB SERPL: 0.6 {RATIO} (ref 0.8–1.8)
ALT SERPL W P-5'-P-CCNC: 28 U/L (ref 12–78)
ANION GAP SERPL CALCULATED.4IONS-SCNC: 8 MMOL/L (ref 6–16)
ANISOCYTOSIS BLD QL SMEAR: (no result) /HPF
AST SERPL W P-5'-P-CCNC: 96 U/L (ref 12–37)
BASOPHILS # BLD AUTO: 0.03 K/MM3 (ref 0–0.23)
BASOPHILS NFR BLD AUTO: 0 % (ref 0–2)
BILIRUB SERPL-MCNC: 2.1 MG/DL (ref 0.1–1)
BUN SERPL-MCNC: 6 MG/DL (ref 8–24)
CALCIUM SERPL-MCNC: 7.7 MG/DL (ref 8.5–10.1)
CHLORIDE SERPL-SCNC: 110 MMOL/L (ref 98–108)
CO2 SERPL-SCNC: 22 MMOL/L (ref 21–32)
CREAT SERPL-MCNC: 0.58 MG/DL (ref 0.4–1)
DEPRECATED RDW RBC AUTO: 90.2 FL (ref 35.1–46.3)
EOSINOPHIL # BLD AUTO: 0.02 K/MM3 (ref 0–0.68)
EOSINOPHIL NFR BLD AUTO: 0 % (ref 0–6)
ERYTHROCYTE [DISTWIDTH] IN BLOOD BY AUTOMATED COUNT: 23.7 % (ref 11.7–14.2)
GLOBULIN SER CALC-MCNC: 3.7 G/DL (ref 2.2–4)
GLUCOSE SERPL-MCNC: 108 MG/DL (ref 70–99)
HCT VFR BLD AUTO: 30.1 % (ref 33–51)
HGB BLD-MCNC: 9.9 G/DL (ref 11.5–16)
IMM GRANULOCYTES # BLD AUTO: 0.26 K/MM3 (ref 0–0.1)
IMM GRANULOCYTES NFR BLD AUTO: 3 % (ref 0–1)
LYMPHOCYTES # BLD AUTO: 1.48 K/MM3 (ref 0.84–5.2)
LYMPHOCYTES NFR BLD AUTO: 16 % (ref 21–46)
MACROCYTES BLD QL SMEAR: (no result) /HPF
MAGNESIUM SERPL-MCNC: 1.5 MG/DL (ref 1.6–2.4)
MCHC RBC AUTO-ENTMCNC: 32.9 G/DL (ref 31.5–36.5)
MCV RBC AUTO: 106 FL (ref 80–100)
MONOCYTES # BLD AUTO: 1.54 K/MM3 (ref 0.16–1.47)
MONOCYTES NFR BLD AUTO: 17 % (ref 4–13)
NEUTROPHILS # BLD AUTO: 5.67 K/MM3 (ref 1.96–9.15)
NEUTROPHILS NFR BLD AUTO: 63 % (ref 41–73)
NRBC # BLD AUTO: 0.05 K/MM3 (ref 0–0.02)
NRBC BLD AUTO-RTO: 0.6 /100 WBC (ref 0–0.2)
PHOSPHATE SERPL-MCNC: 3.2 MG/DL (ref 2.5–4.9)
PLATELET # BLD AUTO: 135 K/MM3 (ref 150–400)
POLYCHROMASIA BLD QL SMEAR: (no result) /HPF
POTASSIUM SERPL-SCNC: 4.1 MMOL/L (ref 3.5–5.5)
PROT SERPL-MCNC: 5.9 G/DL (ref 6.4–8.2)
SODIUM SERPL-SCNC: 140 MMOL/L (ref 136–145)

## 2020-05-13 LAB
%HYPO/RBC NFR BLD AUTO: (no result) /HPF
ALBUMIN SERPL BCP-MCNC: 2.2 G/DL (ref 3.4–5)
ALBUMIN/GLOB SERPL: 0.6 {RATIO} (ref 0.8–1.8)
ALT SERPL W P-5'-P-CCNC: 25 U/L (ref 12–78)
ANION GAP SERPL CALCULATED.4IONS-SCNC: 9 MMOL/L (ref 6–16)
ANISOCYTOSIS BLD QL SMEAR: (no result) /HPF
AST SERPL W P-5'-P-CCNC: 69 U/L (ref 12–37)
BASOPHILS # BLD AUTO: 0.07 K/MM3 (ref 0–0.23)
BASOPHILS NFR BLD AUTO: 1 % (ref 0–2)
BILIRUB SERPL-MCNC: 1.6 MG/DL (ref 0.1–1)
BUN SERPL-MCNC: 8 MG/DL (ref 8–24)
CALCIUM SERPL-MCNC: 8 MG/DL (ref 8.5–10.1)
CHLORIDE SERPL-SCNC: 111 MMOL/L (ref 98–108)
CO2 SERPL-SCNC: 21 MMOL/L (ref 21–32)
CREAT SERPL-MCNC: 0.61 MG/DL (ref 0.4–1)
DEPRECATED RDW RBC AUTO: 92.6 FL (ref 35.1–46.3)
EOSINOPHIL # BLD AUTO: 0.04 K/MM3 (ref 0–0.68)
EOSINOPHIL NFR BLD AUTO: 0 % (ref 0–6)
ERYTHROCYTE [DISTWIDTH] IN BLOOD BY AUTOMATED COUNT: 23.9 % (ref 11.7–14.2)
FOLATE SERPL-MCNC: 27.6 NG/ML (ref 5.2–?)
GLOBULIN SER CALC-MCNC: 3.5 G/DL (ref 2.2–4)
GLUCOSE SERPL-MCNC: 93 MG/DL (ref 70–99)
HCT VFR BLD AUTO: 29.6 % (ref 33–51)
HGB BLD-MCNC: 9.5 G/DL (ref 11.5–16)
IMM GRANULOCYTES # BLD AUTO: 0.36 K/MM3 (ref 0–0.1)
IMM GRANULOCYTES NFR BLD AUTO: 4 % (ref 0–1)
LYMPHOCYTES # BLD AUTO: 1.79 K/MM3 (ref 0.84–5.2)
LYMPHOCYTES NFR BLD AUTO: 20 % (ref 21–46)
MACROCYTES BLD QL SMEAR: (no result) /HPF
MAGNESIUM SERPL-MCNC: 1.7 MG/DL (ref 1.6–2.4)
MCHC RBC AUTO-ENTMCNC: 32.1 G/DL (ref 31.5–36.5)
MCV RBC AUTO: 108 FL (ref 80–100)
MICROCYTES BLD QL SMEAR: (no result) /HPF
MONOCYTES # BLD AUTO: 1.59 K/MM3 (ref 0.16–1.47)
MONOCYTES NFR BLD AUTO: 17 % (ref 4–13)
NEUTROPHILS # BLD AUTO: 5.33 K/MM3 (ref 1.96–9.15)
NEUTROPHILS NFR BLD AUTO: 58 % (ref 41–73)
NRBC # BLD AUTO: 0.02 K/MM3 (ref 0–0.02)
NRBC BLD AUTO-RTO: 0.2 /100 WBC (ref 0–0.2)
PLATELET # BLD AUTO: 127 K/MM3 (ref 150–400)
POTASSIUM SERPL-SCNC: 3.6 MMOL/L (ref 3.5–5.5)
PROT SERPL-MCNC: 5.7 G/DL (ref 6.4–8.2)
SODIUM SERPL-SCNC: 141 MMOL/L (ref 136–145)
TOXIC GRANULES BLD QL SMEAR: PRESENT
VIT B12 SERPL-MCNC: 965 PG/ML (ref 180–914)

## 2020-05-14 LAB
ALBUMIN SERPL BCP-MCNC: 2.2 G/DL (ref 3.4–5)
ALBUMIN/GLOB SERPL: 0.6 {RATIO} (ref 0.8–1.8)
ALT SERPL W P-5'-P-CCNC: 26 U/L (ref 12–78)
ANION GAP SERPL CALCULATED.4IONS-SCNC: 8 MMOL/L (ref 6–16)
AST SERPL W P-5'-P-CCNC: 77 U/L (ref 12–37)
BILIRUB SERPL-MCNC: 1.6 MG/DL (ref 0.1–1)
BUN SERPL-MCNC: 11 MG/DL (ref 8–24)
CALCIUM SERPL-MCNC: 8.3 MG/DL (ref 8.5–10.1)
CHLORIDE SERPL-SCNC: 111 MMOL/L (ref 98–108)
CO2 SERPL-SCNC: 24 MMOL/L (ref 21–32)
CREAT SERPL-MCNC: 0.62 MG/DL (ref 0.4–1)
GLOBULIN SER CALC-MCNC: 3.6 G/DL (ref 2.2–4)
GLUCOSE SERPL-MCNC: 113 MG/DL (ref 70–99)
POTASSIUM SERPL-SCNC: 4.1 MMOL/L (ref 3.5–5.5)
PROT SERPL-MCNC: 5.8 G/DL (ref 6.4–8.2)
SODIUM SERPL-SCNC: 143 MMOL/L (ref 136–145)

## 2020-05-15 LAB
ALBUMIN SERPL BCP-MCNC: 2.4 G/DL (ref 3.4–5)
ALBUMIN/GLOB SERPL: 0.6 {RATIO} (ref 0.8–1.8)
ALT SERPL W P-5'-P-CCNC: 37 U/L (ref 12–78)
ANION GAP SERPL CALCULATED.4IONS-SCNC: 9 MMOL/L (ref 6–16)
AST SERPL W P-5'-P-CCNC: 107 U/L (ref 12–37)
BILIRUB SERPL-MCNC: 1.3 MG/DL (ref 0.1–1)
BUN SERPL-MCNC: 15 MG/DL (ref 8–24)
CALCIUM SERPL-MCNC: 8.4 MG/DL (ref 8.5–10.1)
CHLORIDE SERPL-SCNC: 110 MMOL/L (ref 98–108)
CO2 SERPL-SCNC: 24 MMOL/L (ref 21–32)
CREAT SERPL-MCNC: 0.69 MG/DL (ref 0.4–1)
GLOBULIN SER CALC-MCNC: 4 G/DL (ref 2.2–4)
GLUCOSE SERPL-MCNC: 83 MG/DL (ref 70–99)
POTASSIUM SERPL-SCNC: 4.1 MMOL/L (ref 3.5–5.5)
PROT SERPL-MCNC: 6.4 G/DL (ref 6.4–8.2)
SODIUM SERPL-SCNC: 143 MMOL/L (ref 136–145)

## 2020-05-22 ENCOUNTER — HOSPITAL ENCOUNTER (OUTPATIENT)
Dept: HOSPITAL 95 - LAB RH | Age: 60
End: 2020-05-22
Attending: NURSE PRACTITIONER
Payer: COMMERCIAL

## 2020-05-22 DIAGNOSIS — I10: Primary | ICD-10-CM

## 2020-05-22 LAB
ANION GAP SERPL CALCULATED.4IONS-SCNC: 6 MMOL/L (ref 6–16)
BUN SERPL-MCNC: 16 MG/DL (ref 8–24)
CALCIUM SERPL-MCNC: 9.4 MG/DL (ref 8.5–10.1)
CHLORIDE SERPL-SCNC: 107 MMOL/L (ref 98–108)
CO2 SERPL-SCNC: 28 MMOL/L (ref 21–32)
CREAT SERPL-MCNC: 0.78 MG/DL (ref 0.4–1)
GLUCOSE SERPL-MCNC: 83 MG/DL (ref 70–99)
POTASSIUM SERPL-SCNC: 4.7 MMOL/L (ref 3.5–5.5)
SODIUM SERPL-SCNC: 141 MMOL/L (ref 136–145)

## 2020-08-26 ENCOUNTER — HOSPITAL ENCOUNTER (OUTPATIENT)
Dept: HOSPITAL 95 - LAB SHORT | Age: 60
Discharge: HOME | End: 2020-08-26
Attending: SPECIALIST
Payer: COMMERCIAL

## 2020-08-26 DIAGNOSIS — D63.1: ICD-10-CM

## 2020-08-26 DIAGNOSIS — R94.5: ICD-10-CM

## 2020-08-26 DIAGNOSIS — D51.8: ICD-10-CM

## 2020-08-26 DIAGNOSIS — E78.00: ICD-10-CM

## 2020-08-26 DIAGNOSIS — N18.3: Primary | ICD-10-CM

## 2020-08-26 DIAGNOSIS — N25.81: ICD-10-CM

## 2020-08-26 DIAGNOSIS — E55.9: ICD-10-CM

## 2020-08-26 DIAGNOSIS — R76.9: ICD-10-CM

## 2020-08-26 DIAGNOSIS — D52.8: ICD-10-CM

## 2020-08-26 DIAGNOSIS — D50.9: ICD-10-CM

## 2020-08-26 DIAGNOSIS — R94.6: ICD-10-CM

## 2020-08-26 LAB
MICROALBUMIN 24H UR-MCNC: <5 MG/L (ref 0–20)
PROT UR-MCNC: 6.9 MG/DL (ref 0–11.9)

## 2020-08-31 LAB — PROT UR-MCNC: <4 MG/DL

## 2020-09-01 LAB — PROT UR-MCNC: 5.4 MG/DL

## 2020-10-09 ENCOUNTER — HOSPITAL ENCOUNTER (OUTPATIENT)
Dept: HOSPITAL 95 - LAB | Age: 60
Discharge: HOME | End: 2020-10-09
Attending: SPECIALIST
Payer: COMMERCIAL

## 2020-10-09 DIAGNOSIS — R94.6: ICD-10-CM

## 2020-10-09 DIAGNOSIS — R76.9: ICD-10-CM

## 2020-10-09 DIAGNOSIS — N18.30: Primary | ICD-10-CM

## 2020-10-09 DIAGNOSIS — E55.9: ICD-10-CM

## 2020-10-09 DIAGNOSIS — G60.9: ICD-10-CM

## 2020-10-09 DIAGNOSIS — D51.8: ICD-10-CM

## 2020-10-09 DIAGNOSIS — D63.1: ICD-10-CM

## 2020-10-09 DIAGNOSIS — R94.5: ICD-10-CM

## 2020-10-09 DIAGNOSIS — N25.81: ICD-10-CM

## 2020-10-09 LAB
MICROALBUMIN 24H UR-MCNC: 6.62 MG/L (ref 0–20)
PROT UR-MCNC: 9.7 MG/DL (ref 0–11.9)

## 2020-10-25 ENCOUNTER — HOSPITAL ENCOUNTER (OUTPATIENT)
Dept: HOSPITAL 95 - LAB EV | Age: 60
End: 2020-10-25
Attending: NURSE PRACTITIONER
Payer: COMMERCIAL

## 2020-10-25 DIAGNOSIS — F10.20: Primary | ICD-10-CM

## 2020-10-25 LAB
ALBUMIN SERPL BCP-MCNC: 3.5 G/DL (ref 3.4–5)
ALBUMIN/GLOB SERPL: 0.9 {RATIO} (ref 0.8–1.8)
ALT SERPL W P-5'-P-CCNC: 100 U/L (ref 12–78)
ANION GAP SERPL CALCULATED.4IONS-SCNC: 18 MMOL/L (ref 6–16)
AST SERPL W P-5'-P-CCNC: 60 U/L (ref 12–37)
BILIRUB SERPL-MCNC: 0.3 MG/DL (ref 0.1–1)
BUN SERPL-MCNC: 53 MG/DL (ref 8–24)
CALCIUM SERPL-MCNC: 9.1 MG/DL (ref 8.5–10.1)
CHLORIDE SERPL-SCNC: 108 MMOL/L (ref 98–108)
CO2 SERPL-SCNC: 12 MMOL/L (ref 21–32)
CREAT SERPL-MCNC: 2.23 MG/DL (ref 0.4–1)
GLOBULIN SER CALC-MCNC: 4 G/DL (ref 2.2–4)
GLUCOSE SERPL-MCNC: 92 MG/DL (ref 70–99)
POTASSIUM SERPL-SCNC: 5.2 MMOL/L (ref 3.5–5.5)
PROT SERPL-MCNC: 7.5 G/DL (ref 6.4–8.2)
SODIUM SERPL-SCNC: 138 MMOL/L (ref 136–145)

## 2020-10-28 LAB — MAGNESIUM SERPL-MCNC: 1.7 MG/DL (ref 1.6–2.4)

## 2020-11-05 ENCOUNTER — HOSPITAL ENCOUNTER (OUTPATIENT)
Dept: HOSPITAL 95 - LAB SHORT | Age: 60
End: 2020-11-05
Attending: NURSE PRACTITIONER
Payer: COMMERCIAL

## 2020-11-05 DIAGNOSIS — R19.7: Primary | ICD-10-CM

## 2022-08-04 ENCOUNTER — HOSPITAL ENCOUNTER (OUTPATIENT)
Dept: HOSPITAL 95 - LAB | Age: 62
Discharge: HOME | End: 2022-08-04
Attending: PHYSICIAN ASSISTANT
Payer: COMMERCIAL

## 2022-08-04 DIAGNOSIS — E80.6: ICD-10-CM

## 2022-08-04 DIAGNOSIS — R82.90: ICD-10-CM

## 2022-08-04 DIAGNOSIS — R10.9: Primary | ICD-10-CM

## 2022-08-04 LAB
ALBUMIN SERPL BCP-MCNC: 2.1 G/DL (ref 3.4–5)
ALBUMIN/GLOB SERPL: 0.4 {RATIO} (ref 0.8–1.8)
ALT SERPL W P-5'-P-CCNC: 16 U/L (ref 12–78)
ANION GAP SERPL CALCULATED.4IONS-SCNC: 6 MMOL/L (ref 6–16)
AST SERPL W P-5'-P-CCNC: 77 U/L (ref 12–37)
BASOPHILS # BLD AUTO: 0.1 K/MM3 (ref 0–0.23)
BASOPHILS NFR BLD AUTO: 1 % (ref 0–2)
BILIRUB DIRECT SERPL-MCNC: 1.4 MG/DL (ref 0–0.3)
BILIRUB INDIRECT SERPL-MCNC: 0.8 MG/DL (ref 0.1–0.7)
BILIRUB SERPL-MCNC: 2.1 MG/DL (ref 0.1–1)
BILIRUB SERPL-MCNC: 2.2 MG/DL (ref 0.1–1)
BUN SERPL-MCNC: 3 MG/DL (ref 8–24)
CALCIUM SERPL-MCNC: 7.1 MG/DL (ref 8.5–10.1)
CHLORIDE SERPL-SCNC: 102 MMOL/L (ref 98–108)
CO2 SERPL-SCNC: 31 MMOL/L (ref 21–32)
CREAT SERPL-MCNC: 0.81 MG/DL (ref 0.4–1)
DEPRECATED RDW RBC AUTO: 56.7 FL (ref 35.1–46.3)
EOSINOPHIL # BLD AUTO: 0.13 K/MM3 (ref 0–0.68)
EOSINOPHIL NFR BLD AUTO: 1 % (ref 0–6)
ERYTHROCYTE [DISTWIDTH] IN BLOOD BY AUTOMATED COUNT: 14.8 % (ref 11.7–14.2)
GLOBULIN SER CALC-MCNC: 4.7 G/DL (ref 2.2–4)
GLUCOSE SERPL-MCNC: 124 MG/DL (ref 70–99)
HCT VFR BLD AUTO: 31.3 % (ref 33–51)
HGB BLD-MCNC: 11.4 G/DL (ref 11.5–16)
IMM GRANULOCYTES # BLD AUTO: 0.22 K/MM3 (ref 0–0.1)
IMM GRANULOCYTES NFR BLD AUTO: 2 % (ref 0–1)
LYMPHOCYTES # BLD AUTO: 1.71 K/MM3 (ref 0.84–5.2)
LYMPHOCYTES NFR BLD AUTO: 14 % (ref 21–46)
MCHC RBC AUTO-ENTMCNC: 36.4 G/DL (ref 31.5–36.5)
MCV RBC AUTO: 107 FL (ref 80–100)
MONOCYTES # BLD AUTO: 1.61 K/MM3 (ref 0.16–1.47)
MONOCYTES NFR BLD AUTO: 13 % (ref 4–13)
NEUTROPHILS # BLD AUTO: 8.33 K/MM3 (ref 1.96–9.15)
NEUTROPHILS NFR BLD AUTO: 69 % (ref 41–73)
NRBC # BLD AUTO: 0.04 K/MM3 (ref 0–0.02)
NRBC BLD AUTO-RTO: 0.3 /100 WBC (ref 0–0.2)
PLATELET # BLD AUTO: 228 K/MM3 (ref 150–400)
POTASSIUM SERPL-SCNC: 3 MMOL/L (ref 3.5–5.5)
PROT SERPL-MCNC: 6.8 G/DL (ref 6.4–8.2)
SODIUM SERPL-SCNC: 139 MMOL/L (ref 136–145)

## 2022-08-07 ENCOUNTER — HOSPITAL ENCOUNTER (OUTPATIENT)
Dept: HOSPITAL 95 - LAB SHORT | Age: 62
Discharge: HOME | End: 2022-08-07
Attending: PHYSICIAN ASSISTANT
Payer: COMMERCIAL

## 2022-08-07 DIAGNOSIS — D53.9: Primary | ICD-10-CM

## 2022-08-07 DIAGNOSIS — R19.7: ICD-10-CM

## 2022-08-09 LAB — C DIFF TOX GENS STL QL NAA+PROBE: NEGATIVE

## 2022-10-04 ENCOUNTER — HOSPITAL ENCOUNTER (EMERGENCY)
Dept: HOSPITAL 95 - ER | Age: 62
LOS: 1 days | Discharge: HOME | End: 2022-10-05
Payer: COMMERCIAL

## 2022-10-04 VITALS — WEIGHT: 130.01 LBS | BODY MASS INDEX: 23.92 KG/M2 | HEIGHT: 62 IN

## 2022-10-04 DIAGNOSIS — Z88.5: ICD-10-CM

## 2022-10-04 DIAGNOSIS — Z79.899: ICD-10-CM

## 2022-10-04 DIAGNOSIS — K74.60: Primary | ICD-10-CM

## 2022-10-04 DIAGNOSIS — R18.8: ICD-10-CM

## 2022-10-04 DIAGNOSIS — I85.10: ICD-10-CM

## 2022-10-04 DIAGNOSIS — F17.200: ICD-10-CM

## 2022-10-04 DIAGNOSIS — I10: ICD-10-CM

## 2022-10-04 DIAGNOSIS — F10.10: ICD-10-CM

## 2022-10-04 DIAGNOSIS — R19.7: ICD-10-CM

## 2022-10-04 LAB
ALBUMIN SERPL BCP-MCNC: 2.2 G/DL (ref 3.4–5)
ALBUMIN/GLOB SERPL: 0.5 {RATIO} (ref 0.8–1.8)
ALT SERPL W P-5'-P-CCNC: 26 U/L (ref 12–78)
ANION GAP SERPL CALCULATED.4IONS-SCNC: 8 MMOL/L (ref 6–16)
AST SERPL W P-5'-P-CCNC: 107 U/L (ref 12–37)
BASOPHILS # BLD AUTO: 0.1 K/MM3 (ref 0–0.23)
BASOPHILS NFR BLD AUTO: 1 % (ref 0–2)
BILIRUB SERPL-MCNC: 1.2 MG/DL (ref 0.1–1)
BUN SERPL-MCNC: 3 MG/DL (ref 8–24)
CALCIUM SERPL-MCNC: 7.8 MG/DL (ref 8.5–10.1)
CHLORIDE SERPL-SCNC: 106 MMOL/L (ref 98–108)
CO2 SERPL-SCNC: 27 MMOL/L (ref 21–32)
CREAT SERPL-MCNC: 0.7 MG/DL (ref 0.4–1)
DEPRECATED RDW RBC AUTO: 55.8 FL (ref 35.1–46.3)
EOSINOPHIL # BLD AUTO: 0.21 K/MM3 (ref 0–0.68)
EOSINOPHIL NFR BLD AUTO: 2 % (ref 0–6)
ERYTHROCYTE [DISTWIDTH] IN BLOOD BY AUTOMATED COUNT: 15.4 % (ref 11.7–14.2)
GLOBULIN SER CALC-MCNC: 4.2 G/DL (ref 2.2–4)
GLUCOSE SERPL-MCNC: 100 MG/DL (ref 70–99)
HCT VFR BLD AUTO: 32 % (ref 33–51)
HGB BLD-MCNC: 11.9 G/DL (ref 11.5–16)
IMM GRANULOCYTES # BLD AUTO: 0.08 K/MM3 (ref 0–0.1)
IMM GRANULOCYTES NFR BLD AUTO: 1 % (ref 0–1)
LEUKOCYTE ESTERASE UR QL STRIP: (no result)
LYMPHOCYTES # BLD AUTO: 2.28 K/MM3 (ref 0.84–5.2)
LYMPHOCYTES NFR BLD AUTO: 19 % (ref 21–46)
MCHC RBC AUTO-ENTMCNC: 37.2 G/DL (ref 31.5–36.5)
MCV RBC AUTO: 104 FL (ref 80–100)
MONOCYTES # BLD AUTO: 1.41 K/MM3 (ref 0.16–1.47)
MONOCYTES NFR BLD AUTO: 12 % (ref 4–13)
NEUTROPHILS # BLD AUTO: 8.16 K/MM3 (ref 1.96–9.15)
NEUTROPHILS NFR BLD AUTO: 67 % (ref 41–73)
NRBC # BLD AUTO: 0.02 K/MM3 (ref 0–0.02)
NRBC BLD AUTO-RTO: 0.2 /100 WBC (ref 0–0.2)
PLATELET # BLD AUTO: 190 K/MM3 (ref 150–400)
POTASSIUM SERPL-SCNC: 3.7 MMOL/L (ref 3.5–5.5)
PROT SERPL-MCNC: 6.4 G/DL (ref 6.4–8.2)
SODIUM SERPL-SCNC: 141 MMOL/L (ref 136–145)
SP GR SPEC: 1.01 (ref 1–1.02)
UROBILINOGEN UR STRIP-MCNC: (no result) MG/DL
WBC #/AREA URNS HPF: (no result) /HPF (ref 0–5)

## 2023-07-17 ENCOUNTER — HOSPITAL ENCOUNTER (EMERGENCY)
Dept: HOSPITAL 95 - ER | Age: 63
Discharge: HOME | End: 2023-07-17
Payer: COMMERCIAL

## 2023-07-17 VITALS — DIASTOLIC BLOOD PRESSURE: 70 MMHG | SYSTOLIC BLOOD PRESSURE: 121 MMHG

## 2023-07-17 VITALS — WEIGHT: 137 LBS | HEIGHT: 62 IN | BODY MASS INDEX: 25.21 KG/M2

## 2023-07-17 DIAGNOSIS — I10: ICD-10-CM

## 2023-07-17 DIAGNOSIS — R11.0: ICD-10-CM

## 2023-07-17 DIAGNOSIS — E87.70: ICD-10-CM

## 2023-07-17 DIAGNOSIS — R60.0: ICD-10-CM

## 2023-07-17 DIAGNOSIS — E87.20: ICD-10-CM

## 2023-07-17 DIAGNOSIS — Z79.51: ICD-10-CM

## 2023-07-17 DIAGNOSIS — R74.02: ICD-10-CM

## 2023-07-17 DIAGNOSIS — Z88.5: ICD-10-CM

## 2023-07-17 DIAGNOSIS — Z79.899: ICD-10-CM

## 2023-07-17 DIAGNOSIS — R94.5: ICD-10-CM

## 2023-07-17 DIAGNOSIS — K70.30: Primary | ICD-10-CM

## 2023-07-17 DIAGNOSIS — E78.5: ICD-10-CM

## 2023-07-17 DIAGNOSIS — E87.6: ICD-10-CM

## 2023-07-17 DIAGNOSIS — J45.909: ICD-10-CM

## 2023-07-17 DIAGNOSIS — F17.210: ICD-10-CM

## 2023-07-17 LAB
ALBUMIN SERPL BCP-MCNC: 2.5 G/DL (ref 3.4–5)
ALBUMIN/GLOB SERPL: 0.6 {RATIO} (ref 0.8–1.8)
ALT SERPL W P-5'-P-CCNC: 31 U/L (ref 12–78)
ANION GAP SERPL CALCULATED.4IONS-SCNC: 11 MMOL/L (ref 6–16)
AST SERPL W P-5'-P-CCNC: 97 U/L (ref 12–37)
BASOPHILS # BLD AUTO: 0.1 K/MM3 (ref 0–0.23)
BASOPHILS NFR BLD AUTO: 1 % (ref 0–2)
BILIRUB SERPL-MCNC: 11.4 MG/DL (ref 0.1–1)
BUN SERPL-MCNC: 6 MG/DL (ref 8–24)
CALCIUM SERPL-MCNC: 7.1 MG/DL (ref 8.5–10.1)
CHLORIDE SERPL-SCNC: 97 MMOL/L (ref 98–108)
CO2 SERPL-SCNC: 29 MMOL/L (ref 21–32)
CREAT SERPL-MCNC: 0.76 MG/DL (ref 0.4–1)
DEPRECATED RDW RBC AUTO: 53.7 FL (ref 35.1–46.3)
EOSINOPHIL # BLD AUTO: 0.13 K/MM3 (ref 0–0.68)
EOSINOPHIL NFR BLD AUTO: 1 % (ref 0–6)
ERYTHROCYTE [DISTWIDTH] IN BLOOD BY AUTOMATED COUNT: 13.2 % (ref 11.7–14.2)
GLOBULIN SER CALC-MCNC: 4 G/DL (ref 2.2–4)
GLUCOSE SERPL-MCNC: 87 MG/DL (ref 70–99)
HCT VFR BLD AUTO: 26.8 % (ref 33–51)
HGB BLD-MCNC: 9.9 G/DL (ref 11.5–16)
IMM GRANULOCYTES # BLD AUTO: 0.09 K/MM3 (ref 0–0.1)
IMM GRANULOCYTES NFR BLD AUTO: 1 % (ref 0–1)
LYMPHOCYTES # BLD AUTO: 1.91 K/MM3 (ref 0.84–5.2)
LYMPHOCYTES NFR BLD AUTO: 15 % (ref 21–46)
MCHC RBC AUTO-ENTMCNC: 36.9 G/DL (ref 31.5–36.5)
MCV RBC AUTO: 111 FL (ref 80–100)
MONOCYTES # BLD AUTO: 1.37 K/MM3 (ref 0.16–1.47)
MONOCYTES NFR BLD AUTO: 11 % (ref 4–13)
NEUTROPHILS # BLD AUTO: 9.16 K/MM3 (ref 1.96–9.15)
NEUTROPHILS NFR BLD AUTO: 72 % (ref 41–73)
NRBC # BLD AUTO: 0 K/MM3 (ref 0–0.02)
NRBC BLD AUTO-RTO: 0 /100 WBC (ref 0–0.2)
PLATELET # BLD AUTO: 126 K/MM3 (ref 150–400)
POTASSIUM SERPL-SCNC: 3 MMOL/L (ref 3.5–5.5)
PROT SERPL-MCNC: 6.5 G/DL (ref 6.4–8.2)
SODIUM SERPL-SCNC: 137 MMOL/L (ref 136–145)

## 2023-07-17 PROCEDURE — A9270 NON-COVERED ITEM OR SERVICE: HCPCS

## 2023-08-06 ENCOUNTER — HOSPITAL ENCOUNTER (OUTPATIENT)
Dept: HOSPITAL 95 - ER | Age: 63
Setting detail: OBSERVATION
LOS: 1 days | End: 2023-08-07
Attending: HOSPITALIST | Admitting: HOSPITALIST
Payer: COMMERCIAL

## 2023-08-06 VITALS — WEIGHT: 117.99 LBS | HEIGHT: 60 IN | BODY MASS INDEX: 23.16 KG/M2

## 2023-08-06 VITALS — DIASTOLIC BLOOD PRESSURE: 42 MMHG | SYSTOLIC BLOOD PRESSURE: 70 MMHG

## 2023-08-06 DIAGNOSIS — F32.A: ICD-10-CM

## 2023-08-06 DIAGNOSIS — J44.9: ICD-10-CM

## 2023-08-06 DIAGNOSIS — L40.9: ICD-10-CM

## 2023-08-06 DIAGNOSIS — K72.10: Primary | ICD-10-CM

## 2023-08-06 DIAGNOSIS — Z88.5: ICD-10-CM

## 2023-08-06 DIAGNOSIS — K70.30: ICD-10-CM

## 2023-08-06 DIAGNOSIS — I10: ICD-10-CM

## 2023-08-06 DIAGNOSIS — M32.9: ICD-10-CM

## 2023-08-06 DIAGNOSIS — Z51.5: ICD-10-CM

## 2023-08-06 DIAGNOSIS — E78.5: ICD-10-CM

## 2023-08-06 LAB
ALBUMIN SERPL BCP-MCNC: 3.7 G/DL (ref 3.4–5)
ALBUMIN/GLOB SERPL: 0.6 {RATIO} (ref 0.8–1.8)
ALT SERPL W P-5'-P-CCNC: 37 U/L (ref 12–78)
ANION GAP SERPL CALCULATED.4IONS-SCNC: 8 MMOL/L (ref 6–16)
APAP SERPL-MCNC: <2 UG/ML (ref 10–30)
AST SERPL W P-5'-P-CCNC: 46 U/L (ref 12–37)
BASOPHILS # BLD AUTO: 0.05 K/MM3 (ref 0–0.23)
BASOPHILS NFR BLD AUTO: 0 % (ref 0–2)
BILIRUB SERPL-MCNC: 2.6 MG/DL (ref 0.1–1)
BUN SERPL-MCNC: 64 MG/DL (ref 8–24)
CALCIUM SERPL-MCNC: 9.2 MG/DL (ref 8.5–10.1)
CHLORIDE SERPL-SCNC: 113 MMOL/L (ref 98–108)
CO2 SERPL-SCNC: 14 MMOL/L (ref 21–32)
CREAT SERPL-MCNC: 5.52 MG/DL (ref 0.4–1)
DEPRECATED RDW RBC AUTO: 59.6 FL (ref 35.1–46.3)
EOSINOPHIL # BLD AUTO: 0.02 K/MM3 (ref 0–0.68)
EOSINOPHIL NFR BLD AUTO: 0 % (ref 0–6)
ERYTHROCYTE [DISTWIDTH] IN BLOOD BY AUTOMATED COUNT: 13.7 % (ref 11.7–14.2)
GLOBULIN SER CALC-MCNC: 5.8 G/DL (ref 2.2–4)
GLUCOSE SERPL-MCNC: 114 MG/DL (ref 70–99)
HCT VFR BLD AUTO: 38.7 % (ref 33–51)
HGB BLD-MCNC: 13.1 G/DL (ref 11.5–16)
IMM GRANULOCYTES # BLD AUTO: 0.08 K/MM3 (ref 0–0.1)
IMM GRANULOCYTES NFR BLD AUTO: 0 % (ref 0–1)
LYMPHOCYTES # BLD AUTO: 0.86 K/MM3 (ref 0.84–5.2)
LYMPHOCYTES NFR BLD AUTO: 4 % (ref 21–46)
MAGNESIUM SERPL-MCNC: 2.5 MG/DL (ref 1.6–2.4)
MCHC RBC AUTO-ENTMCNC: 33.9 G/DL (ref 31.5–36.5)
MCV RBC AUTO: 118 FL (ref 80–100)
MONOCYTES # BLD AUTO: 1.42 K/MM3 (ref 0.16–1.47)
MONOCYTES NFR BLD AUTO: 7 % (ref 4–13)
NEUTROPHILS # BLD AUTO: 17.49 K/MM3 (ref 1.96–9.15)
NEUTROPHILS NFR BLD AUTO: 88 % (ref 41–73)
NRBC # BLD AUTO: 0 K/MM3 (ref 0–0.02)
NRBC BLD AUTO-RTO: 0 /100 WBC (ref 0–0.2)
PH BLDV: 7.15 [PH] (ref 7.34–7.37)
PHOSPHATE SERPL-MCNC: 7 MG/DL (ref 2.5–4.9)
PLATELET # BLD AUTO: 308 K/MM3 (ref 150–400)
POTASSIUM SERPL-SCNC: 9.4 MMOL/L (ref 3.5–5.5)
PROT SERPL-MCNC: 9.5 G/DL (ref 6.4–8.2)
SALICYLATES SERPL-MCNC: <1.7 MG/DL (ref 2.8–20)
SODIUM SERPL-SCNC: 135 MMOL/L (ref 136–145)

## 2023-08-06 PROCEDURE — G0480 DRUG TEST DEF 1-7 CLASSES: HCPCS

## 2023-08-06 PROCEDURE — G0378 HOSPITAL OBSERVATION PER HR: HCPCS

## 2023-08-07 NOTE — NUR
PT TURNED BY CNA AT 0355, PT FOUND DECESED AT 0405.  BUSHRA NOTIFIED AT
0422,  THOUGHT AT FIRST HE WANTED TO COME SEE PT BUT LATER DECIDED HE
WOULD NOT COME IN.  [facility] HOSPITALIST CALLED AND NOTIFIED AT 0436, OF PT PASSING. Deep Sutures: 4-0 Vicryl

## 2023-08-07 NOTE — NUR
pt arrived to [facility] about 2025 and was transfred by4 persons to bed from
[facility]. pt non responsive. pt hs on o2 at 2L per nc. pt has a few brused to
knees legs and arms. pt has abrasions and sratches to arms and left knee. pt
positioned with pillows. later pt given a bath, pt had been vomiting earlyer
but now non responsive and no emisis. pt at this time having guppy breathing
respertions 30 a min. in ed report that pt was opening eyes but no longer
showes any response.